# Patient Record
(demographics unavailable — no encounter records)

---

## 2024-10-28 NOTE — HISTORY OF PRESENT ILLNESS
[FreeTextEntry1] : follow up hyperlipidemia and abnormal TFTs testicular/groin pain [de-identified] : 45 year old male PMH as below presents for general follow up.  He was found to have LDL of 168 on his FLP from 3/2024.  He has worked on a low cholesterol diet for the past 6 months with a 14 pound weight loss.  He changed his diet to essentially be a vegetarian.  He is no longer eating animal products and he has greatly reduced the sugars and fried food in his diet.  He believes that his current diet is sustainable long term. For exercise, he runs at the track and plays tennis.  He notes left groin pain beginning 1 month ago which radiated to his testicles.  He was seen for this at urgent care and sent for testicular sonogram.  This study noted a epididymal cyst, varicocele and hydrocele.  He states that the pain has diminished.  His mother was recently diagnosed with ALS.  He inquires if there is any genetic basis for this disease.  The patient has an upcoming colonoscopy scheduled with Dr. Jones in [place].

## 2024-10-28 NOTE — ASSESSMENT
[FreeTextEntry1] : 45 year old male presents for follow up of hyperlipidemia, abnormal TFTs, lower abdominal pain/testicular pain, family hx ALS.

## 2024-10-28 NOTE — PHYSICAL EXAM
[No Acute Distress] : no acute distress [Well Nourished] : well nourished [Well Developed] : well developed [Well-Appearing] : well-appearing [Normal Voice/Communication] : normal voice/communication [Normal Sclera/Conjunctiva] : normal sclera/conjunctiva [PERRL] : pupils equal round and reactive to light [EOMI] : extraocular movements intact [Normal Outer Ear/Nose] : the outer ears and nose were normal in appearance [Normal Oropharynx] : the oropharynx was normal [Normal TMs] : both tympanic membranes were normal [Normal Nasal Mucosa] : the nasal mucosa was normal [No JVD] : no jugular venous distention [No Lymphadenopathy] : no lymphadenopathy [Supple] : supple [Thyroid Normal, No Nodules] : the thyroid was normal and there were no nodules present [No Respiratory Distress] : no respiratory distress  [No Accessory Muscle Use] : no accessory muscle use [Clear to Auscultation] : lungs were clear to auscultation bilaterally [Normal Rate] : normal rate  [Regular Rhythm] : with a regular rhythm [Normal S1, S2] : normal S1 and S2 [No Murmur] : no murmur heard [No Carotid Bruits] : no carotid bruits [No Abdominal Bruit] : a ~M bruit was not heard ~T in the abdomen [No Varicosities] : no varicosities [Pedal Pulses Present] : the pedal pulses are present [No Edema] : there was no peripheral edema [No Palpable Aorta] : no palpable aorta [Soft] : abdomen soft [Non Tender] : non-tender [Non-distended] : non-distended [No Masses] : no abdominal mass palpated [No HSM] : no HSM [Normal Bowel Sounds] : normal bowel sounds [No Hernias] : no hernias [Normal Supraclavicular Nodes] : no supraclavicular lymphadenopathy [Normal Posterior Cervical Nodes] : no posterior cervical lymphadenopathy [Normal Anterior Cervical Nodes] : no anterior cervical lymphadenopathy [No CVA Tenderness] : no CVA  tenderness [No Spinal Tenderness] : no spinal tenderness [Kyphosis] : no kyphosis [Scoliosis] : no scoliosis [No Joint Swelling] : no joint swelling [Grossly Normal Strength/Tone] : grossly normal strength/tone [No Rash] : no rash [Acne] : no acne [Coordination Grossly Intact] : coordination grossly intact [No Focal Deficits] : no focal deficits [Normal Gait] : normal gait [Deep Tendon Reflexes (DTR)] : deep tendon reflexes were 2+ and symmetric [Speech Grossly Normal] : speech grossly normal [Memory Grossly Normal] : memory grossly normal [Normal Affect] : the affect was normal [Alert and Oriented x3] : oriented to person, place, and time [Normal Mood] : the mood was normal [Normal Insight/Judgement] : insight and judgment were intact [Normal] : affect was normal and insight and judgment were intact

## 2024-10-28 NOTE — PLAN
[FreeTextEntry1] : Ophthalmology astigmatism of right eye - s/p relaxing incision - continue to follow up with ophthalmologist, Dr. Bergeron Cardiology hyperlipidemia - continue low cholesterol/low fat diet - check FLP history of hypertriglyceridemia - continue Omega-3 Fish Oil p.o.q.d. as directed - continue low cholesterol/low fat and low carbohydrate/low sugar diet - check FLP Gastroenterology colorectal cancer screening - await colonoscopy with Dr. Mann Endocrinology h/o thyroid nodule - no longer present on last US thyroid history of Vitamin D insufficiency - continue Vitamin D 1000 IU p.o.q.d. with a meal as directed - check Vitamin D abnormal TFTs-check thyroid panel Urology lower abdominopelvic pain-referred to Dr. Parish Beard of urology for consultation Neurology FH ALS in mother-reviewed genetics and heritability of ALS with patient-it is up to patient if he would like to obtain a genetics consultation or not.  check FLP and TFTs

## 2024-10-28 NOTE — REVIEW OF SYSTEMS
[Recent Change In Weight] : ~T recent weight change [Abdominal Pain] : abdominal pain [Negative] : Heme/Lymph